# Patient Record
Sex: MALE | Race: BLACK OR AFRICAN AMERICAN | NOT HISPANIC OR LATINO | Employment: FULL TIME | URBAN - METROPOLITAN AREA
[De-identification: names, ages, dates, MRNs, and addresses within clinical notes are randomized per-mention and may not be internally consistent; named-entity substitution may affect disease eponyms.]

---

## 2021-05-01 ENCOUNTER — HOSPITAL ENCOUNTER (EMERGENCY)
Facility: HOSPITAL | Age: 31
Discharge: HOME | End: 2021-05-01
Attending: EMERGENCY MEDICINE
Payer: MEDICAID

## 2021-05-01 VITALS
HEIGHT: 74 IN | DIASTOLIC BLOOD PRESSURE: 71 MMHG | RESPIRATION RATE: 18 BRPM | BODY MASS INDEX: 19.9 KG/M2 | TEMPERATURE: 99 F | HEART RATE: 69 BPM | SYSTOLIC BLOOD PRESSURE: 131 MMHG | OXYGEN SATURATION: 98 %

## 2021-05-01 DIAGNOSIS — U07.1 COVID-19: Primary | ICD-10-CM

## 2021-05-01 DIAGNOSIS — R05.9 COUGH: ICD-10-CM

## 2021-05-01 DIAGNOSIS — R50.9 FEVER: ICD-10-CM

## 2021-05-01 LAB
CTP QC/QA: YES
HIV 1+2 AB+HIV1 P24 AG SERPL QL IA: NEGATIVE
INFLUENZA A, MOLECULAR: NEGATIVE
INFLUENZA B, MOLECULAR: NEGATIVE
SARS-COV-2 RDRP RESP QL NAA+PROBE: POSITIVE
SPECIMEN SOURCE: NORMAL

## 2021-05-01 PROCEDURE — 25000003 PHARM REV CODE 250: Performed by: NURSE PRACTITIONER

## 2021-05-01 PROCEDURE — 99284 EMERGENCY DEPT VISIT MOD MDM: CPT | Mod: 25

## 2021-05-01 PROCEDURE — 86703 HIV-1/HIV-2 1 RESULT ANTBDY: CPT | Performed by: EMERGENCY MEDICINE

## 2021-05-01 PROCEDURE — 87502 INFLUENZA DNA AMP PROBE: CPT | Performed by: NURSE PRACTITIONER

## 2021-05-01 PROCEDURE — U0002 COVID-19 LAB TEST NON-CDC: HCPCS | Performed by: EMERGENCY MEDICINE

## 2021-05-01 RX ORDER — PROMETHAZINE HYDROCHLORIDE AND DEXTROMETHORPHAN HYDROBROMIDE 6.25; 15 MG/5ML; MG/5ML
10 SYRUP ORAL EVERY 6 HOURS PRN
Qty: 150 ML | Refills: 0 | Status: SHIPPED | OUTPATIENT
Start: 2021-05-01 | End: 2021-05-11

## 2021-05-01 RX ORDER — FAMOTIDINE 20 MG/1
20 TABLET, FILM COATED ORAL 2 TIMES DAILY
Qty: 28 TABLET | Refills: 0 | Status: SHIPPED | OUTPATIENT
Start: 2021-05-01 | End: 2021-05-15

## 2021-05-01 RX ORDER — CETIRIZINE HYDROCHLORIDE 10 MG/1
10 TABLET ORAL DAILY
Qty: 14 TABLET | Refills: 0 | Status: SHIPPED | OUTPATIENT
Start: 2021-05-01 | End: 2021-05-15

## 2021-05-01 RX ORDER — ACETAMINOPHEN 500 MG
1000 TABLET ORAL
Status: COMPLETED | OUTPATIENT
Start: 2021-05-01 | End: 2021-05-01

## 2021-05-01 RX ORDER — IBUPROFEN 600 MG/1
600 TABLET ORAL
Status: COMPLETED | OUTPATIENT
Start: 2021-05-01 | End: 2021-05-01

## 2021-05-01 RX ADMIN — IBUPROFEN 600 MG: 600 TABLET, FILM COATED ORAL at 10:05

## 2021-05-01 RX ADMIN — ACETAMINOPHEN 1000 MG: 500 TABLET ORAL at 10:05

## 2021-05-02 DIAGNOSIS — U07.1 COVID-19 VIRUS DETECTED: ICD-10-CM

## 2024-09-18 ENCOUNTER — OFFICE VISIT (OUTPATIENT)
Dept: URGENT CARE | Facility: CLINIC | Age: 34
End: 2024-09-18
Payer: MEDICAID

## 2024-09-18 VITALS
SYSTOLIC BLOOD PRESSURE: 107 MMHG | DIASTOLIC BLOOD PRESSURE: 63 MMHG | RESPIRATION RATE: 18 BRPM | WEIGHT: 162 LBS | HEIGHT: 73 IN | TEMPERATURE: 99 F | BODY MASS INDEX: 21.47 KG/M2 | OXYGEN SATURATION: 95 % | HEART RATE: 80 BPM

## 2024-09-18 DIAGNOSIS — R07.9 CHEST PAIN, UNSPECIFIED TYPE: Primary | ICD-10-CM

## 2024-09-18 LAB
OHS QRS DURATION: 82 MS
OHS QTC CALCULATION: 406 MS

## 2024-09-18 PROCEDURE — 93010 ELECTROCARDIOGRAM REPORT: CPT | Mod: S$PBB,,, | Performed by: INTERNAL MEDICINE

## 2024-09-18 PROCEDURE — 99204 OFFICE O/P NEW MOD 45 MIN: CPT | Mod: S$GLB,,,

## 2024-09-18 PROCEDURE — 93005 ELECTROCARDIOGRAM TRACING: CPT | Mod: S$GLB,,,

## 2024-09-18 RX ORDER — LIDOCAINE HYDROCHLORIDE 20 MG/ML
10 SOLUTION OROPHARYNGEAL
Status: COMPLETED | OUTPATIENT
Start: 2024-09-18 | End: 2024-09-18

## 2024-09-18 RX ORDER — ALUMINUM HYDROXIDE, MAGNESIUM HYDROXIDE, AND SIMETHICONE 1200; 120; 1200 MG/30ML; MG/30ML; MG/30ML
30 SUSPENSION ORAL
Status: COMPLETED | OUTPATIENT
Start: 2024-09-18 | End: 2024-09-18

## 2024-09-18 RX ORDER — DICYCLOMINE HYDROCHLORIDE 10 MG/5ML
20 SOLUTION ORAL
Status: COMPLETED | OUTPATIENT
Start: 2024-09-18 | End: 2024-09-18

## 2024-09-18 RX ORDER — KETOROLAC TROMETHAMINE 30 MG/ML
30 INJECTION, SOLUTION INTRAMUSCULAR; INTRAVENOUS
Status: COMPLETED | OUTPATIENT
Start: 2024-09-18 | End: 2024-09-18

## 2024-09-18 RX ADMIN — KETOROLAC TROMETHAMINE 30 MG: 30 INJECTION, SOLUTION INTRAMUSCULAR; INTRAVENOUS at 02:09

## 2024-09-18 RX ADMIN — DICYCLOMINE HYDROCHLORIDE 20 MG: 10 SOLUTION ORAL at 02:09

## 2024-09-18 RX ADMIN — ALUMINUM HYDROXIDE, MAGNESIUM HYDROXIDE, AND SIMETHICONE 30 ML: 1200; 120; 1200 SUSPENSION ORAL at 02:09

## 2024-09-18 RX ADMIN — LIDOCAINE HYDROCHLORIDE 10 ML: 20 SOLUTION OROPHARYNGEAL at 02:09

## 2024-09-18 NOTE — PATIENT INSTRUCTIONS
" TODAY YOU WERE EVALUATED IN THE URGENT CARE FOR CHEST PAIN.   Although your EKG did not show an acute ST elevation MI, there are other types of "heart attack" and other serious causes for your Chest Pain. Further testing for other causes of your chest pain is beyond the scope of Urgent Care and if there is a concern, you should go to the ER for further evaluation. Based on your current signs and symptoms and diagnostic testing, it appears there is a low likelihood that this is due to a cardiac etiology, but it cannot be completely ruled out in the Urgent Care setting. If your chest pain persists or worsens and you develop additional symptoms such as Shortness of Breath, changes in mental status, profuse sweating or loss of consciousness, then you must go to the ER or call 911.      You received an injection of a powerful NSAID today - Toradol. Its effects will last up to 24 hours. Please do not take another NSAID (i.e. Aspirin, Ibuprofen, Aleve, Advil or Motrin) until this time tomorrow. If you continue to have pain, you may take Tylenol (acetaminophen) if you are not allergic to this medication.  "

## 2024-09-18 NOTE — PROGRESS NOTES
"Subjective:      Patient ID: Daryl Scott is a 34 y.o. male.    Vitals:  height is 6' 1" (1.854 m) and weight is 73.5 kg (162 lb). His tympanic temperature is 98.6 °F (37 °C). His blood pressure is 107/63 and his pulse is 80. His respiration is 18 and oxygen saturation is 95%.     Chief Complaint: Chest Pain    Patient presents with left sided chest pain, radiating towards his left shoulder and towards his abdomen. Symptoms worsen with coughing. Patient denies trauma to his chest. On set of symptom 1 hour ago. Patient went to work this morning, had grits and hash browns, beer. Patient feels like there is something stocked in this chest. Patient`s mom gave him 81 mg aspirin.     Chest Pain   This is a new problem. The current episode started today. The onset quality is sudden. The problem occurs constantly. The problem has been unchanged. The pain is present in the substernal region. The pain is at a severity of 9/10. The pain is moderate. The quality of the pain is described as numbness, pressure and stabbing. The pain radiates to the left shoulder (abdomen). Associated symptoms include abdominal pain. Pertinent negatives include no back pain, claudication, cough, diaphoresis, dizziness, exertional chest pressure, fever, headaches, hemoptysis, irregular heartbeat, leg pain, lower extremity edema, malaise/fatigue, nausea, near-syncope, numbness, orthopnea, palpitations, PND, shortness of breath, sputum production, syncope, vomiting or weakness. The pain is aggravated by coughing. Treatments tried: Aspirin. The treatment provided no relief. Risk factors include smoking/tobacco exposure, stress and male gender.   Pertinent negatives for past medical history include no aneurysm, no anxiety/panic attacks, no aortic aneurysm, no aortic dissection, no arrhythmia, no bicuspid aortic valve, no CAD, no cancer, no congenital heart disease, no connective tissue disease, no COPD, no CHF, no diabetes, no DVT, no " hyperhomocysteinemia, no hyperlipidemia, no hypertension, no Kawasaki disease, no Marfan's syndrome, no MI, no mitral valve prolapse, no pacemaker, no PE, no PVD, no recent injury, no rheumatic fever, no seizures, no sickle cell disease, no sleep apnea, no spontaneous pneumothorax, no stimulant use, no strokes, no thyroid problem, no TIA, Marmolejo syndrome and no valve disorder.   His family medical history is significant for heart disease, hyperlipidemia, hypertension, early MI and sudden death.   Pertinent negatives for family medical history include: no aortic dissection, no CAD, no connective tissue disease, no diabetes, no Marfan's syndrome, no PE, no PVD, no sickle cell disease, no stroke and no TIA.       Constitution: Negative for sweating and fever.   Cardiovascular:  Positive for chest pain. Negative for palpitations and passing out.   Respiratory:  Negative for cough, sputum production, bloody sputum, COPD and shortness of breath.    Gastrointestinal:  Positive for abdominal pain. Negative for nausea and vomiting.   Musculoskeletal:  Negative for back pain.   Neurological:  Negative for dizziness, headaches, numbness and seizures.      Objective:     Physical Exam   Constitutional: He is oriented to person, place, and time. He appears well-developed. He is cooperative.  Non-toxic appearance. He does not appear ill. No distress.   HENT:   Head: Normocephalic and atraumatic.   Ears:   Right Ear: Hearing, tympanic membrane, external ear and ear canal normal.   Left Ear: Hearing, tympanic membrane, external ear and ear canal normal.   Nose: Nose normal. No mucosal edema, rhinorrhea or nasal deformity. No epistaxis. Right sinus exhibits no maxillary sinus tenderness and no frontal sinus tenderness. Left sinus exhibits no maxillary sinus tenderness and no frontal sinus tenderness.   Mouth/Throat: Uvula is midline, oropharynx is clear and moist and mucous membranes are normal. No trismus in the jaw. Normal  dentition. No uvula swelling. No posterior oropharyngeal erythema.   Eyes: Conjunctivae and lids are normal. Right eye exhibits no discharge. Left eye exhibits no discharge. No scleral icterus.   Neck: Trachea normal and phonation normal. Neck supple.   Cardiovascular: Normal rate, regular rhythm, normal heart sounds and normal pulses.   Pulmonary/Chest: Effort normal and breath sounds normal. No respiratory distress. He exhibits tenderness.         Comments: Anterior chest wall tenderness to palpation. No crepitus.     Abdominal: Normal appearance and bowel sounds are normal. He exhibits no distension and no mass. Soft. flat abdomen There is abdominal tenderness in the epigastric area. There is no rebound and no guarding.   Musculoskeletal: Normal range of motion.         General: No deformity. Normal range of motion.   Neurological: He is alert and oriented to person, place, and time. He exhibits normal muscle tone. Coordination normal.   Skin: Skin is warm, dry, intact, not diaphoretic and not pale.   Psychiatric: His speech is normal and behavior is normal. Judgment and thought content normal.   Nursing note and vitals reviewed.      Assessment:     1. Chest pain, unspecified type        The EKG is normal, showing regular Sinus Rhythm, at a rate of 90, there are not ST Changes. There is a previous EKG for comparison. This EKG was interpreted by me.      Plan:       Chest pain, unspecified type  -     ketorolac injection 30 mg  -     LIDOcaine viscous HCl 2% oral solution 10 mL  -     aluminum-magnesium hydroxide-simethicone 200-200-20 mg/5 mL suspension 30 mL  -     dicyclomine 10 mg/5 mL syrup 20 mg  -     IN OFFICE EKG 12-LEAD (to [place])          Medical Decision Making:   Differential Diagnosis:   Costochondritis, GERD, anxiety, substance use, ACS  Urgent Care Management:  35 yo black male presents with CP that onset pta after eating. Substernal CP and radiation to L shoulder. Symptoms are constant. EKG  ordered. No evidence of STEMI. No ST changes. YOLANDA score = 0. Reproducible chest wall pain on exam. Symptoms improved after Toradol IM. GI cocktail with pain relief. No NSAIDs for 24 hours. Tylenol as needed for pain. Increase fluid intake. Strict ER precautions given for worsening CP, SOB, numbness/tingling, slurred speech, dizziness, or confusion. Patient and mother verbalize understanding. Patient exits exam room in NAD.     Additional MDM:     YOLANDA Score:   Age over 65:                                    0.00   > or = to 3 CAD risk factors:           0.00  Established CAD:                            0.00  > or = to 2 anginal events in the past 24 hours: 0.00  Use of ASA in past 7 days:              0.00  ST Depression > or = to 0.05 mV:  0.00        Heart Failure Score:   COPD = No

## 2025-04-21 ENCOUNTER — OFFICE VISIT (OUTPATIENT)
Dept: URGENT CARE | Facility: CLINIC | Age: 35
End: 2025-04-21
Payer: COMMERCIAL

## 2025-04-21 ENCOUNTER — OCHSNER VIRTUAL EMERGENCY DEPARTMENT (OUTPATIENT)
Facility: CLINIC | Age: 35
End: 2025-04-21
Payer: COMMERCIAL

## 2025-04-21 ENCOUNTER — PATIENT OUTREACH (OUTPATIENT)
Facility: OTHER | Age: 35
End: 2025-04-21
Payer: COMMERCIAL

## 2025-04-21 VITALS
RESPIRATION RATE: 16 BRPM | WEIGHT: 176.06 LBS | HEIGHT: 74 IN | SYSTOLIC BLOOD PRESSURE: 113 MMHG | BODY MASS INDEX: 22.6 KG/M2 | DIASTOLIC BLOOD PRESSURE: 75 MMHG | OXYGEN SATURATION: 97 % | TEMPERATURE: 98 F | HEART RATE: 81 BPM

## 2025-04-21 DIAGNOSIS — L03.031 CELLULITIS OF TOE OF RIGHT FOOT: ICD-10-CM

## 2025-04-21 DIAGNOSIS — I89.1: Primary | ICD-10-CM

## 2025-04-21 DIAGNOSIS — L08.9 INFECTION OF TOE: ICD-10-CM

## 2025-04-21 DIAGNOSIS — M79.671 ACUTE FOOT PAIN, RIGHT: ICD-10-CM

## 2025-04-21 DIAGNOSIS — L03.119 CELLULITIS OF FOOT: Primary | ICD-10-CM

## 2025-04-21 DIAGNOSIS — I89.1: ICD-10-CM

## 2025-04-21 PROCEDURE — 73630 X-RAY EXAM OF FOOT: CPT | Mod: RT,S$GLB,, | Performed by: RADIOLOGY

## 2025-04-21 PROCEDURE — 99214 OFFICE O/P EST MOD 30 MIN: CPT | Mod: S$GLB,,, | Performed by: NURSE PRACTITIONER

## 2025-04-22 ENCOUNTER — PATIENT OUTREACH (OUTPATIENT)
Facility: OTHER | Age: 35
End: 2025-04-22
Payer: COMMERCIAL

## 2025-04-22 NOTE — PATIENT INSTRUCTIONS
Proceed to Emergency Department      Wound Infection     How does the doctor treat this health problem?   You may need to have the wound cleaned and infected tissue or fluid removed. Other times, you will need to take antibiotics. You may need to have these antibiotics through an IV if you have a serious infection. More surgery may be needed.      Urgent Care Discharge Information    If you have been discharged from the clinic prior to your point of care test results being completed, please make sure to check your [facility] account.  If there is a change in treatment, we will communicate with you through here.  If your test is positive, and medications are ordered, these will be sent to your preferred pharmacy.   If your test is negative, no further steps needed. If you do not hear from us or have questions, please call the clinic.        - You must understand that you have received an Urgent Care treatment only and that you may be released before all of your medical problems are known or treated.   - You, the patient, will arrange for follow up care as instructed with your primary care provider or recommended specialist.   -  Please arrange follow up with your primary medical clinic as soon as possible.   - If your condition worsens or fails to improve we recommend that you receive another evaluation at the ER immediately or contact your PCP to discuss your concerns, or return here.   - Please do not drive or make any important decisions for 24 hours if you have received any pain medications, sedatives or mood altering drugs during your visit.    WE CANNOT RULE OUT ALL POSSIBLE CAUSES OF YOUR SYMPTOMS IN THE URGENT CARE SETTING.  PLEASE GO TO THE ER IF YOU FEELS YOUR CONDITION IS WORSENING OR YOU WOULD LIKE EMERGENT EVALUATION.  GO TO THE EMERGENCY DEPARTMENT FOR ANY NEW OR WORSENING SYMPTOMS INCLUDING:  WORSENING ABDOMINAL PAIN, DARK/BLACK/BLOODY BOWEL MOVEMENTS, VOMITING BLOOD, HARD ABDOMEN, FEVER, CHEST PAIN,  SHORTNESS OF BREATH, LOSS OF CONSCIOUSNESS, OR ANY OTHER CONCERN.

## 2025-04-22 NOTE — PLAN OF CARE-OVED
"[facility] Plan of Care Note  Referral Source: Urgent Care                               Chief Complaint   Patient presents with    Cellulitis     34 to male with no significant PMHx presented to  with concern for foot pain. Pt has wound in between toes since Saturday when he played basketball. No known injury / bite / or blister. Red line goes up 16" on his lower leg.  Pulse is intact, no femoral LN. Pain is 9/10 when walking, 6/10 when not walking on it. X-ray in progress. Provider states patient will not move 5th MTP 2/2 pain and cries out when she attempts to move it.     Recommendation: Emergency Department            Emergency Department: [facility]             Advised if provider is concerned for septic joint then recommend sending to ED. Otherwise if xray without gas or signs of osteomyelitis and pain controlled can trial antibiotics for cellulitis and very close follow up. [facility] wait times given.     Encounter Diagnoses   Name Primary?    Cellulitis of foot Yes    Lymphangitis of toe              "

## 2025-04-22 NOTE — PROGRESS NOTES
Patient was seen in urgent care and advised to go to the Emergency Department. No Emergency Department encounter is noted. A follow-up call was placed to assess additional needs, but there was no answer. A voicemail was left requesting a return call. Assistance will be provided as needed if the patient returns the call.

## 2025-04-22 NOTE — PROGRESS NOTES
"Subjective:      Patient ID: Daryl Scott is a 34 y.o. male.    Vitals:  height is 6' 2" (1.88 m) and weight is 79.9 kg (176 lb 0.6 oz). His tympanic temperature is 97.9 °F (36.6 °C). His blood pressure is 113/75 and his pulse is 81. His respiration is 16 and oxygen saturation is 97%.     Chief Complaint: Foot Pain    Patient presents with right foot pain and swelling. Pain started x2 days ago. No known fall or injury.     Foot Pain  This is a new problem. The current episode started in the past 7 days. The problem occurs constantly. The problem has been gradually worsening. Associated symptoms include joint swelling. Pertinent negatives include no abdominal pain, anorexia, arthralgias, change in bowel habit, chest pain, chills, congestion, coughing, diaphoresis, fatigue, fever, headaches, myalgias, nausea, neck pain, numbness, rash, sore throat, swollen glands, urinary symptoms, vertigo, visual change, vomiting or weakness. The symptoms are aggravated by walking. He has tried nothing for the symptoms.       Constitution: Negative for chills, sweating, fatigue and fever.   HENT:  Negative for congestion and sore throat.    Neck: Negative for neck pain.   Cardiovascular:  Negative for chest pain.   Respiratory:  Negative for cough.    Gastrointestinal:  Negative for abdominal pain, nausea and vomiting.   Musculoskeletal:  Positive for joint swelling. Negative for joint pain and muscle ache.   Skin:  Positive for erythema. Negative for rash.   Neurological:  Negative for history of vertigo, headaches and numbness.      Objective:     Physical Exam   Constitutional: He is oriented to person, place, and time. He appears well-developed. He is cooperative. He appears ill. normal and well-groomedawake  HENT:   Head: Normocephalic and atraumatic. Head is without abrasion, without contusion and without laceration.   Ears:   Right Ear: Hearing and external ear normal.   Left Ear: Hearing and external ear normal.   Nose: Nose " "normal. No rhinorrhea or nasal deformity. No epistaxis.   Mouth/Throat: Uvula is midline, oropharynx is clear and moist and mucous membranes are normal.   Eyes: Conjunctivae, EOM and lids are normal. Pupils are equal, round, and reactive to light.   Neck: Trachea normal and phonation normal. Neck supple.   Cardiovascular: Normal rate, regular rhythm, normal heart sounds and normal pulses.   Pulses:       Dorsalis pedis pulses are 2+ on the right side.        Posterior tibial pulses are 2+ on the right side.   Pulmonary/Chest: Effort normal and breath sounds normal. No stridor. No respiratory distress. He has no decreased breath sounds.   Abdominal: Normal appearance. flat abdomen There is no abdominal tenderness.   Musculoskeletal:      Right ankle: Normal.      Left ankle: Normal.      Right foot: Decreased range of motion. Right 4th toe: Exhibits decreased ROM, swelling and tenderness. Injuries: blister. Right little toe: Exhibits decreased ROM, swelling and tenderness. There is bruising present. Plantar foot sensation: normal. Comments: No active range of movement, passive range of movement minimum due to pain.  Not able to bear weight due to pain.        Feet:    Lymphadenopathy: No inguinal adenopathy noted on the right side.   Neurological: He is alert and oriented to person, place, and time.   Skin: Skin is warm, dry, no rash and abscessed. Capillary refill takes less than 2 seconds. erythema and lesion No abrasion, No burn, No bruising and No ecchymosis              Comments: Broken skin with infection between 4th and 5th right toe with redness and warmth.  Lmphangitis running 16" up leg towards right knee.  See attached photos.   Psychiatric: His speech is normal and behavior is normal. Judgment and thought content normal.   Nursing note and vitals reviewed.      Assessment:     1. Lymphangitis of toe    2. Cellulitis of toe of right foot    3. Acute foot pain, right    4. Infection of toe  " "            Plan:   Lymphangitis goes up 16" up right leg.    Referring to emergency department for further evaluation and possible IV antibiotics for treatment.      Lymphangitis of toe  -     Refer to Emergency Dept.  -     CRUTCHES FOR HOME USE    Cellulitis of toe of right foot    Acute foot pain, right  -     Cancel: XR FOOT COMPLETE 3 VIEW RIGHT; Future; Expected date: 04/21/2025  -     XR FOOT COMPLETE 3 VIEW RIGHT; Future; Expected date: 04/21/2025  -     Refer to Emergency Dept.  -     CRUTCHES FOR HOME USE    Infection of toe  -     Refer to Emergency Dept.  -     CRUTCHES FOR HOME USE    Type of Interpretation: Outside Written Report.  Radiology Procedure Done: Right Foot.  Interpretation: Narrative & Impression     EXAM: XR FOOT COMPLETE 3 VIEW RIGHT     CLINICAL INDICATION:   Pain in right foot     FINDINGS:  No comparison studies are available.  3 views of the right foot were submitted for interpretation.  Talar beak.     Alignment is satisfactory. No     fractures, dislocations, or erosive arthritic change.  Negative for radiopaque foreign bodies or air in the soft tissues.        Impression:     1.  Negative for acute process involving the visualized osseous structures.     Finalized on: 4/21/2025 8:13 PM By:  Bishop Cisneros MD  [facility]# [id number]      2025-04-21 20:15:[id number]     [facility]                Patient going to [facility] ER.  Report called in to ER:  08:25 to QUENTIN Dennis.          Narrative & Impression     EXAM: XR FOOT COMPLETE 3 VIEW RIGHT     CLINICAL INDICATION:   Pain in right foot     FINDINGS:  No comparison studies are available.  3 views of the right foot were submitted for interpretation.  Talar beak.     Alignment is satisfactory. No     fractures, dislocations, or erosive arthritic change.  Negative for radiopaque foreign bodies or air in the soft tissues.        Impression:     1.  Negative for acute process involving the visualized osseous structures.     Finalized on: 4/21/2025 8:13 PM By:  " Bishop Cisneros MD  [facility]# [id number]      2025-04-21 20:15:[id number]     [facility]

## 2025-04-22 NOTE — PROGRESS NOTES
"Patient seen by Corina Puentes NP at [facility] with complaint of "Pt has wound in between toes since Saturday when he played basketball. No known injury / bite / or blister. Red line goes up 16" on his lower leg."    [facility] RN consulted with [facility] provider, Dr. Stevens, and disposition recommended was emergency department.  Will follow up with patient to assess for any additional concerns to be addressed.  "